# Patient Record
Sex: FEMALE | Race: WHITE | URBAN - METROPOLITAN AREA
[De-identification: names, ages, dates, MRNs, and addresses within clinical notes are randomized per-mention and may not be internally consistent; named-entity substitution may affect disease eponyms.]

---

## 2022-05-06 ENCOUNTER — EMERGENCY (EMERGENCY)
Facility: HOSPITAL | Age: 87
LOS: 1 days | Discharge: HOME | End: 2022-05-06
Admitting: EMERGENCY MEDICINE
Payer: MEDICARE

## 2022-05-06 VITALS
HEART RATE: 78 BPM | SYSTOLIC BLOOD PRESSURE: 175 MMHG | DIASTOLIC BLOOD PRESSURE: 78 MMHG | WEIGHT: 160.06 LBS | HEIGHT: 60 IN | OXYGEN SATURATION: 95 % | TEMPERATURE: 98 F | RESPIRATION RATE: 16 BRPM

## 2022-05-06 PROCEDURE — 99283 EMERGENCY DEPT VISIT LOW MDM: CPT

## 2022-05-06 RX ORDER — LIDOCAINE 4 G/100G
1 CREAM TOPICAL ONCE
Refills: 0 | Status: COMPLETED | OUTPATIENT
Start: 2022-05-06 | End: 2022-05-06

## 2022-05-06 RX ORDER — IBUPROFEN 200 MG
600 TABLET ORAL ONCE
Refills: 0 | Status: COMPLETED | OUTPATIENT
Start: 2022-05-06 | End: 2022-05-06

## 2022-05-06 RX ORDER — ACETAMINOPHEN 500 MG
650 TABLET ORAL ONCE
Refills: 0 | Status: COMPLETED | OUTPATIENT
Start: 2022-05-06 | End: 2022-05-06

## 2022-05-06 RX ADMIN — Medication 600 MILLIGRAM(S): at 11:31

## 2022-05-06 RX ADMIN — LIDOCAINE 1 PATCH: 4 CREAM TOPICAL at 11:31

## 2022-05-06 RX ADMIN — Medication 650 MILLIGRAM(S): at 11:32

## 2022-05-06 NOTE — ED PROVIDER NOTE - PHYSICAL EXAMINATION
VITAL SIGNS: I have reviewed nursing notes and confirm.  CONSTITUTIONAL: Well-developed; well-nourished; in no acute distress.  SKIN: Skin is warm and dry. Normal color for race.  HEAD: Normocephalic; atraumatic.  EYES: Bilaterally clear.  ENT: Airway patent.   CARD: S1, S2 normal; no murmurs, gallops, or rubs. Regular rate and rhythm.  RESP: No wheezes, rales or rhonchi.  ABD: Soft; non-distended; non-tender.   MSK: Pain with movement of right leg.  NEURO: Alert, oriented. Grossly unremarkable.  PSYCH: Cooperative, appropriate.

## 2022-05-06 NOTE — ED PROVIDER NOTE - CHPI ED SYMPTOMS NEG
No urinary retention, no saddle anesthesia. No chills, no headache, no chest pain, no SOB, no abdominal pain, no nausea, no vomiting, no diarrhea./no fever/no numbness/no weakness

## 2022-05-06 NOTE — ED PROVIDER NOTE - CLINICAL SUMMARY MEDICAL DECISION MAKING FREE TEXT BOX
89 y/o F with likely sciatica. Symptomatically treated. Will followup with orthopedist. Social work involved and patient was given information on [facility] for wellness checks. Will discharge with return precautions.

## 2022-05-06 NOTE — ED PROVIDER NOTE - PATIENT PORTAL LINK FT
You can access the FollowMyHealth Patient Portal offered by [facility] by registering at the following website: [email or web address]. By joining [employer]’s FollowMyHealth portal, you will also be able to view your health information using other applications (apps) compatible with our system.

## 2022-05-06 NOTE — ED ADULT NURSE NOTE - OBJECTIVE STATEMENT
Pt presents to ED complaining of atraumatic R buttock/hip pain with radiation down the L leg x days. Pt denies hx of sciatica. Typically ambulates with walker with impaired gait. Denies recent falls. No bruising or deformity to the hip.

## 2022-05-06 NOTE — ED PROVIDER NOTE - OBJECTIVE STATEMENT
89 y/o F presents to ED c/o right-sided leg pain radiating from the buttocks down the right leg. Denies urinary retention, saddle anesthesia, or weakness. Patient states she had pain for 5 days. Pt denies fevers/chills, headache, chest pain, SOB, abdominal pain, N/V/D, weakness, and numbness.

## 2022-05-09 DIAGNOSIS — M79.604 PAIN IN RIGHT LEG: ICD-10-CM
